# Patient Record
Sex: FEMALE | Race: WHITE | NOT HISPANIC OR LATINO | Employment: FULL TIME | URBAN - METROPOLITAN AREA
[De-identification: names, ages, dates, MRNs, and addresses within clinical notes are randomized per-mention and may not be internally consistent; named-entity substitution may affect disease eponyms.]

---

## 2022-03-28 ENCOUNTER — NURSE TRIAGE (OUTPATIENT)
Dept: NURSING | Facility: CLINIC | Age: 37
End: 2022-03-28

## 2022-03-28 NOTE — TELEPHONE ENCOUNTER
S-(situation): Call from patient who says she slammed finger in a doorway. Patient asking if urgent care could relieved the pressure under her nail.    B-(background):       A-(assessment): hematoma under the nail       R-(recommendations): urgent care should be able to relieve the pressure. Caller verbalized understanding.  Patient transferred to urgent care in error.    Left message for patient to call back if any questions/concerns.        Elisa Johnson RN/[facility]    Additional Information    Information only question and nurse able to answer    Negative: Nursing judgment    Negative: Nursing judgment    Negative: Nursing judgment    Negative: Nursing judgment    Protocols used: INFORMATION ONLY CALL - NO TRIAGE-A-[place]

## 2024-07-31 ENCOUNTER — OFFICE VISIT (OUTPATIENT)
Dept: URGENT CARE | Facility: URGENT CARE | Age: 39
End: 2024-07-31
Payer: COMMERCIAL

## 2024-07-31 VITALS
WEIGHT: 281 LBS | TEMPERATURE: 98.3 F | RESPIRATION RATE: 16 BRPM | HEART RATE: 79 BPM | DIASTOLIC BLOOD PRESSURE: 87 MMHG | OXYGEN SATURATION: 97 % | SYSTOLIC BLOOD PRESSURE: 146 MMHG

## 2024-07-31 DIAGNOSIS — R07.0 THROAT PAIN: Primary | ICD-10-CM

## 2024-07-31 DIAGNOSIS — J06.9 VIRAL UPPER RESPIRATORY INFECTION: ICD-10-CM

## 2024-07-31 LAB
DEPRECATED S PYO AG THROAT QL EIA: NEGATIVE
GROUP A STREP BY PCR: NOT DETECTED

## 2024-07-31 PROCEDURE — 87651 STREP A DNA AMP PROBE: CPT | Performed by: PHYSICIAN ASSISTANT

## 2024-07-31 PROCEDURE — 99203 OFFICE O/P NEW LOW 30 MIN: CPT | Performed by: PHYSICIAN ASSISTANT

## 2024-07-31 NOTE — LETTER
July 31, 2024      Philly Villarreal  [address]  [facility] [address]        To Whom It May Concern:    Philly Villarreal  was seen on 7/31.  Please excuse her  until 8/3 due to illness.        Sincerely,        Khang Moore PA-C

## 2024-07-31 NOTE — PROGRESS NOTES
SUBJECTIVE:   Philly Villarreal is a 39 year old female presenting with a chief complaint of cough - non-productive, sore throat, and hoarse voice.  Onset of symptoms was 2 day(s) ago.  Course of illness is same.    Severity moderate  Current and Associated symptoms: as above  Treatment measures tried include Fluids and Rest.  Predisposing factors include ill contact: Work.    No past medical history on file.  Current Outpatient Medications   Medication Sig Dispense Refill    levonorgestrel (MIRENA) 52 MG (20 mcg/day) IUD by Intrauterine route once      albuterol (PROVENTIL HFA: VENTOLIN HFA) 108 (90 BASE) MCG/ACT inhaler Inhale 2 puffs into the lungs every 6 hours. (Patient not taking: Reported on 7/31/2024) 1 each 0    azithromycin (ZITHROMAX Z-ZAHRA) 250 MG tablet Take 1 tablet by mouth. two tablets first day and 1 tablet daily for 4 days (Patient not taking: Reported on 7/31/2024) 6 tablet 0    fluticasone (FLONASE) 50 MCG/ACT nasal spray 2 sprays by Both Nostrils route daily. (Patient not taking: Reported on 7/31/2024) 1 Package 0     Social History     Tobacco Use    Smoking status: Every Day     Current packs/day: 0.50     Average packs/day: 0.5 packs/day for 9.0 years (4.5 ttl pk-yrs)     Types: Cigarettes    Smokeless tobacco: Never   Substance Use Topics    Alcohol use: Not on file       ROS:  Review of systems negative except as stated above.    OBJECTIVE:  BP (!) 146/87 (BP Location: Right arm, Patient Position: Sitting, Cuff Size: Adult Large)   Pulse 79   Temp 98.3  F (36.8  C) (Tympanic)   Resp 16   Wt 127.5 kg (281 lb)   SpO2 97%   GENERAL APPEARANCE: healthy, alert and no distress  HENT: ear canals and TM's normal.  Nose and mouth without ulcers, erythema or lesions  NECK: supple, nontender, no lymphadenopathy  RESP: lungs clear to auscultation - no rales, rhonchi or wheezes  CV: regular rates and rhythm, normal S1 S2, no murmur noted  NEURO: Normal strength and tone, sensory exam grossly normal,   normal speech and mentation  SKIN: no suspicious lesions or rashes      Results for orders placed or performed in visit on 07/31/24   Streptococcus A Rapid Screen w/Reflex to PCR - Clinic Collect     Status: Normal    Specimen: Throat; Swab   Result Value Ref Range    Group A Strep antigen Negative Negative       ASSESSMENT:  (R07.0) Throat pain  (primary encounter diagnosis)  (J06.9) Viral upper respiratory infection  Comment: no evidence of bacteria  Plan: Streptococcus A Rapid Screen w/Reflex to PCR -         Clinic Collect, Group A Streptococcus PCR         Throat Swab      Home covids negative

## 2024-07-31 NOTE — LETTER
July 31, 2024      Philly Villarreal  [address]  [facility] [address]        To Whom It May Concern:    Philly Villarreal  was seen on 7/31.  Please excuse her until 8/2 due to illness.        Sincerely,        Khang Moore PA-C